# Patient Record
Sex: FEMALE | Race: WHITE | NOT HISPANIC OR LATINO | Employment: OTHER | URBAN - METROPOLITAN AREA
[De-identification: names, ages, dates, MRNs, and addresses within clinical notes are randomized per-mention and may not be internally consistent; named-entity substitution may affect disease eponyms.]

---

## 2022-09-20 ENCOUNTER — OFFICE VISIT (OUTPATIENT)
Dept: PODIATRY | Facility: CLINIC | Age: 57
End: 2022-09-20

## 2022-09-20 VITALS — BODY MASS INDEX: 32.65 KG/M2 | RESPIRATION RATE: 20 BRPM | HEIGHT: 65 IN | WEIGHT: 196 LBS

## 2022-09-20 DIAGNOSIS — M79.675 TOE PAIN, LEFT: ICD-10-CM

## 2022-09-20 DIAGNOSIS — L84 CALLUS OF FOOT: ICD-10-CM

## 2022-09-20 DIAGNOSIS — M79.672 LEFT FOOT PAIN: Primary | ICD-10-CM

## 2022-09-20 DIAGNOSIS — I73.9 PAD (PERIPHERAL ARTERY DISEASE): ICD-10-CM

## 2022-09-20 PROCEDURE — 11055 PARING/CUTG B9 HYPRKER LES 1: CPT

## 2022-09-20 PROCEDURE — 99203 OFFICE O/P NEW LOW 30 MIN: CPT

## 2022-09-20 RX ORDER — CLOTRIMAZOLE AND BETAMETHASONE DIPROPIONATE 10; .64 MG/G; MG/G
CREAM TOPICAL
COMMUNITY

## 2022-09-20 RX ORDER — UREA 40 %
1 CREAM (GRAM) TOPICAL 2 TIMES DAILY
Qty: 60 EACH | Refills: 2
Start: 2022-09-20

## 2022-09-20 RX ORDER — FUROSEMIDE 20 MG/1
TABLET ORAL
COMMUNITY

## 2022-09-20 RX ORDER — HYDROCODONE BITARTRATE AND ACETAMINOPHEN 10; 325 MG/1; MG/1
TABLET ORAL
COMMUNITY

## 2022-09-20 RX ORDER — GABAPENTIN 600 MG/1
TABLET ORAL
COMMUNITY

## 2022-09-20 RX ORDER — TOPIRAMATE 100 MG/1
TABLET, FILM COATED ORAL
COMMUNITY

## 2022-09-20 RX ORDER — ERGOCALCIFEROL 1.25 MG/1
CAPSULE ORAL
COMMUNITY

## 2022-09-20 RX ORDER — ESTRADIOL 10 UG/1
INSERT VAGINAL
COMMUNITY

## 2022-09-20 RX ORDER — ATORVASTATIN CALCIUM 40 MG/1
TABLET, FILM COATED ORAL
COMMUNITY

## 2022-09-20 RX ORDER — CARVEDILOL 25 MG/1
TABLET ORAL
COMMUNITY

## 2022-09-20 RX ORDER — COLLAGENASE SANTYL 250 [ARB'U]/G
OINTMENT TOPICAL
COMMUNITY

## 2022-09-20 RX ORDER — ISOSORBIDE MONONITRATE 30 MG/1
TABLET, EXTENDED RELEASE ORAL
COMMUNITY

## 2022-09-20 RX ORDER — MONTELUKAST SODIUM 10 MG/1
TABLET ORAL
COMMUNITY

## 2022-09-20 RX ORDER — NAPROXEN 500 MG/1
TABLET ORAL
COMMUNITY

## 2022-09-20 RX ORDER — BUSPIRONE HYDROCHLORIDE 30 MG/1
TABLET ORAL
COMMUNITY

## 2022-09-20 RX ORDER — NITROGLYCERIN 0.4 MG/1
TABLET SUBLINGUAL
COMMUNITY

## 2022-09-20 RX ORDER — CITALOPRAM 20 MG/1
TABLET ORAL
COMMUNITY

## 2022-09-20 RX ORDER — METHYLPREDNISOLONE 4 MG/1
TABLET ORAL
COMMUNITY
Start: 2022-09-15

## 2022-09-20 RX ORDER — AMMONIUM LACTATE 12 G/100G
CREAM TOPICAL
COMMUNITY

## 2022-09-20 RX ORDER — DIAZEPAM 5 MG/1
TABLET ORAL
COMMUNITY

## 2022-09-20 RX ORDER — HYDROXYZINE HYDROCHLORIDE 25 MG/1
TABLET, FILM COATED ORAL
COMMUNITY

## 2022-09-20 RX ORDER — KETOCONAZOLE 20 MG/G
CREAM TOPICAL
COMMUNITY

## 2022-09-20 RX ORDER — MELOXICAM 15 MG/1
TABLET ORAL
COMMUNITY

## 2022-09-20 RX ORDER — RISPERIDONE 0.25 MG/1
TABLET ORAL EVERY 8 HOURS SCHEDULED
COMMUNITY

## 2022-09-20 RX ORDER — PRIMIDONE 50 MG/1
TABLET ORAL
COMMUNITY

## 2022-09-20 RX ORDER — CLOPIDOGREL BISULFATE 75 MG/1
TABLET ORAL
COMMUNITY

## 2022-09-20 RX ORDER — CYCLOBENZAPRINE HCL 10 MG
TABLET ORAL
COMMUNITY

## 2022-09-20 RX ORDER — POTASSIUM CHLORIDE 750 MG/1
CAPSULE, EXTENDED RELEASE ORAL
COMMUNITY

## 2022-09-20 RX ORDER — ALBUTEROL SULFATE 90 UG/1
AEROSOL, METERED RESPIRATORY (INHALATION)
COMMUNITY

## 2022-09-20 RX ORDER — CALCIUM CARBONATE 200(500)MG
TABLET,CHEWABLE ORAL
COMMUNITY

## 2022-09-20 NOTE — PROGRESS NOTES
"09/20/2022  Foot and Ankle Surgery - New Patient   Provider: ANIL Veliz   Location: [facility]    Subjective:  Lidia Gaming is a 57 y.o. female.     Chief Complaint   Patient presents with   • Right Foot - Callouses   • Left Foot - Callouses   • Initial Evaluation     CYNTHIA Stahl md  9/7/2022       HPI: Patient presents to office today, new to service, with complaints of pain and callus to left lower extremity.  Patient reports that she has seen podiatry service in the past for this issue.  Patient reports history of \"breaking her ankle\" years ago.  Patient reports a history of falling a couple times this year and since then having pain to her left second and third toes.  Patient does have significant history of peripheral artery disease and is being followed by vascular surgery at [facility].  Chart review reveals patient had abnormal TEVIN to left lower extremity most recently and right TEVIN was within normal limits per primary care provider note a few weeks ago.    Allergies   Allergen Reactions   • Aloe Vera Unknown - Low Severity   • Petrolatum Unknown - Low Severity       Past Medical History:   Diagnosis Date   • Anxiety    • COPD (chronic obstructive pulmonary disease) (HCC)    • Heart disease    • Hypercholesterolemia    • Hypertension    • Pulmonary embolism (HCC)    • Status post arterial stent    • Stenosis of coronary artery stent        History reviewed. No pertinent surgical history.    Family History   Problem Relation Age of Onset   • Diabetes Mother    • Diabetes Sister    • Cancer Sister        Social History     Socioeconomic History   • Marital status: Single   Tobacco Use   • Smoking status: Heavy Tobacco Smoker   • Smokeless tobacco: Never Used   Vaping Use   • Vaping Use: Never used   Substance and Sexual Activity   • Alcohol use: Not Currently   • Drug use: Defer   • Sexual activity: Defer        Current Outpatient Medications on File Prior to Visit "   Medication Sig Dispense Refill   • albuterol sulfate  (90 Base) MCG/ACT inhaler albuterol sulfate HFA 90 mcg/actuation aerosol inhaler   INHALE 2 PUFFS EVERY 4 HOURS BY INHALATION ROUTE.     • ammonium lactate (AMLACTIN) 12 % cream ammonium lactate 12 % topical cream     • apixaban (ELIQUIS) 5 MG tablet tablet Eliquis 5 mg tablet     • atorvastatin (LIPITOR) 40 MG tablet atorvastatin 40 mg tablet   TAKE 1 TABLET BY MOUTH EVERY DAY     • busPIRone (BUSPAR) 30 MG tablet buspirone 30 mg tablet     • calcium carbonate (TUMS) 500 MG chewable tablet calcium carbonate antacid regu     • Cariprazine HCl (VRAYLAR) 3 MG capsule capsule Vraylar 3 mg capsule   Take 1 capsule every day by oral route.     • carvedilol (COREG) 25 MG tablet carvedilol 25 mg tablet     • citalopram (CeleXA) 20 MG tablet citalopram 20 mg tablet     • clopidogrel (PLAVIX) 75 MG tablet clopidogrel 75 mg tablet   TAKE 1 TABLET BY MOUTH EVERY DAY     • clotrimazole-betamethasone (LOTRISONE) 1-0.05 % cream clotrimazole-betamethasone 1 %-0.05 % topical cream     • collagenase (Santyl) 250 UNIT/GM ointment Santyl 250 unit/gram topical ointment     • cyclobenzaprine (FLEXERIL) 10 MG tablet cyclobenzaprine 10 mg tablet     • diazePAM (VALIUM) 5 MG tablet diazepam 5 mg tablet     • ergocalciferol (ERGOCALCIFEROL) 1.25 MG (24524 UT) capsule Vitamin D2 1,250 mcg (50,000 unit) capsule   Take 1 capsule every week by oral route.     • estradiol (VAGIFEM) 10 MCG tablet vaginal tablet estradiol 10 mcg vaginal tablet     • furosemide (LASIX) 20 MG tablet furosemide 20 mg tablet     • gabapentin (NEURONTIN) 600 MG tablet gabapentin 600 mg tablet     • HYDROcodone-acetaminophen (NORCO)  MG per tablet hydrocodone 10 mg-acetaminophen 325 mg tablet   TAKE 1 TABLET BY MOUTH THREE TIMES DAILY     • hydrOXYzine (ATARAX) 25 MG tablet hydroxyzine HCl 25 mg tablet     • isosorbide mononitrate (IMDUR) 30 MG 24 hr tablet isosorbide mononitrate ER 30 mg  "tablet,extended release 24 hr     • ketoconazole (NIZORAL) 2 % cream ketoconazole 2 % topical cream     • meloxicam (MOBIC) 15 MG tablet meloxicam 15 mg tablet     • methylPREDNISolone (MEDROL) 4 MG dose pack      • montelukast (SINGULAIR) 10 MG tablet montelukast 10 mg tablet     • naproxen (NAPROSYN) 500 MG tablet naproxen 500 mg tablet     • nitroglycerin (NITROSTAT) 0.4 MG SL tablet nitroglycerin 0.4 mg sublingual tablet     • potassium chloride (MICRO-K) 10 MEQ CR capsule potassium chloride ER 10 mEq capsule,extended release     • primidone (MYSOLINE) 50 MG tablet primidone 50 mg tablet     • risperiDONE (risperDAL) 0.25 MG tablet Every 8 (Eight) Hours.     • sodium chloride 0.9 % nebulizer solution 0.88 mL with albuterol (5 MG/ML) 0.5% nebulizer solution 0.6 mg albuterol sulfate   2 puffs Q 6 hours PRN     • topiramate (TOPAMAX) 100 MG tablet topiramate 100 mg tablet       No current facility-administered medications on file prior to visit.       Review of Systems:  General: Denies fever, chills, fatigue, and weakness.  Eyes: Denies vision loss, blurry vision, and excessive redness.  ENT: Denies hearing issues and difficulty swallowing.  Cardiovascular: Denies palpitations, chest pain, or syncopal episodes.  Respiratory: Denies shortness of breath, wheezing, and coughing.  GI: Denies abdominal pain, nausea, and vomiting.   : Denies frequency, hematuria, and urgency.  Musculoskeletal: Denies muscle cramps, joint pains, and stiffness.  Derm: Denies rash, open wounds, or suspicious lesions.  Neuro: Denies headaches, numbness, loss of coordination, and tremors.  Psych: Denies anxiety and depression.  Endocrine: Denies temperature intolerance and changes in appetite.  Heme: Denies bleeding disorders or abnormal bruising.     Objective   Resp 20   Ht 165.1 cm (65\")   Wt 88.9 kg (196 lb)   BMI 32.62 kg/m²     Foot/Ankle Exam:       General:   Appearance: appears stated age and healthy    Orientation: AAOx3  "   Affect: appropriate    Gait: unimpaired    Assistance: independent    Shoe Gear:  Sandals    VASCULAR      Right Foot Vascularity   Right DP grade: Unable to palpate.  Posterior tibial:  1+  Skin Temperature: warm    Edema Grading:  None  CFT:  < 3 seconds  Pedal Hair Growth:  Present  Varicosities: none       Left Foot Vascularity   Left DP grade: Not able to palpate.  Posterior tibial:  1+  Skin Temperature: warm    Edema Grading:  None  CFT:  < 3 seconds  Pedal Hair Growth:  Absent  Varicosities: none        NEUROLOGIC     Right Foot Neurologic   Light touch sensation:  Normal  Protective Sensation using Wendel-Shiv Monofilament:  10     Left Foot Neurologic   Light touch sensation:  Normal  Protective Sensation using Wendel-Shiv Monofilament:  10     MUSCULOSKELETAL      Right Foot Musculoskeletal   Ecchymosis:  None  Tenderness: none       Left Foot Musculoskeletal   Ecchymosis:  None  Tenderness: left foot callus    Hammertoe:  Second toe and third toe     DERMATOLOGIC     Right Foot Dermatologic   Skin: dry skin and skin changes    Nails: onychomycosis and abnormally thick       Left Foot Dermatologic   Skin: dry skin and skin changes    Nails: onychomycosis and abnormally thick        Assessment & Plan   Diagnoses and all orders for this visit:    1. Left foot pain (Primary)  -     XR Foot 3+ View Left    2. Callus of foot    3. Toe pain, left    4. PAD (peripheral artery disease) ([facility])    Other orders  -     urea (CARMOL) 40 % cream; Apply 1 application topically to the appropriate area as directed 2 (Two) Times a Day. Apply twice daily to calluses.  Dispense: 60 each; Refill: 2      On exam bilateral feet appear stable with no open wounds or signs of active acute infection.  Patient does have moderate callusing to left plantar forefoot.  Pathophysiology of callus was discussed with patient today.  We did review gentle exfoliation with pumice stone and urea-based moisturizer.  Discussed with  "patient that she is at risk for pedal complications related to peripheral artery disease and that it is important for her to continue to monitor her feet closely and to avoid going barefoot.  Patient has mild hammertoe deformity to left second and third toes pathophysiology was discussed with patient today and discussed importance of getting shoes that fit appropriately with toebox to accommodate slight hammertoe deformity.  Will obtain x-ray today to understand pathology and rule out other causes of toe pain.  Recommend patient obtain over-the-counter arch supports with metatarsal pad to offload and redistribute forefoot pressure to prevent callus formation from reoccurring.  Patient does not want to purchase today due to cost.  Patient was also given calf stretching exercises to help with limbering the calf muscle and decreasing forefoot pressure however she reports that she cannot do stretching due to her right knee \"being bone-on-bone\"..      Callus to left plantar forefoot was carefully pared down today with #15 scalpel blade.  No complication and patient tolerated well.    Would like for patient to follow-up in 6 weeks for reevaluation and to review x-ray results.  Patient verbalized understanding and agreeable to plan at this time.    Orders Placed This Encounter   Procedures   • XR Foot 3+ View Left     Order Specific Question:   Reason for Exam:     Answer:   LEFT FOOT PAIN NEAR TOES  ROOM 13  WB   MAY LEAVE AFTER XRAY     Order Specific Question:   Does this patient have a diabetic monitoring/medication delivering device on?     Answer:   No     Order Specific Question:   Release to patient     Answer:   Routine Release        Note is dictated utilizing voice recognition software. Unfortunately this leads to occasional typographical errors. I apologize in advance if the situation occurs. If questions occur please do not hesitate to call our office.    "

## 2023-03-30 ENCOUNTER — OFFICE VISIT (OUTPATIENT)
Dept: ORTHOPEDIC SURGERY | Facility: CLINIC | Age: 58
End: 2023-03-30
Payer: MEDICARE

## 2023-03-30 VITALS — BODY MASS INDEX: 33.12 KG/M2 | WEIGHT: 199 LBS | OXYGEN SATURATION: 96 % | HEART RATE: 66 BPM

## 2023-03-30 DIAGNOSIS — M17.11 PRIMARY OSTEOARTHRITIS OF RIGHT KNEE: ICD-10-CM

## 2023-03-30 DIAGNOSIS — G89.29 CHRONIC PAIN OF RIGHT KNEE: Primary | ICD-10-CM

## 2023-03-30 DIAGNOSIS — Z72.0 TOBACCO ABUSE DISORDER: ICD-10-CM

## 2023-03-30 DIAGNOSIS — M25.561 CHRONIC PAIN OF RIGHT KNEE: Primary | ICD-10-CM

## 2023-03-30 RX ORDER — TOPIRAMATE 50 MG/1
50 TABLET, FILM COATED ORAL 2 TIMES DAILY
COMMUNITY

## 2023-03-30 RX ORDER — CHOLECALCIFEROL (VITAMIN D3) 125 MCG
500 CAPSULE ORAL DAILY
COMMUNITY

## 2023-03-30 RX ORDER — VITAMIN E 268 MG
CAPSULE ORAL
COMMUNITY

## 2023-03-30 RX ORDER — FLUTICASONE FUROATE, UMECLIDINIUM BROMIDE AND VILANTEROL TRIFENATATE 200; 62.5; 25 UG/1; UG/1; UG/1
POWDER RESPIRATORY (INHALATION)
COMMUNITY

## 2023-03-30 NOTE — PROGRESS NOTES
NEW VISIT    Patient: Lidia Gaming  ?  YOB: 1965    MRN: [id number]  ?  Chief Complaint   Patient presents with   • Right Knee - Initial Evaluation          HPI: Patient 57-year-old female who presents today for chronic right knee pain.  Denies any acute injury.  She was recently seen by her PCP, and has also been seen by 2 prior orthopedists Dr. Arriaza and Dr. Ogden locally.  She has tried conservative measures for her knee pain, and at this time wants to discuss potential surgical options.  She had discussed a partial knee replacement with Dr. Arriaza, but was seeking a second opinion as to a partial versus a full knee replacement.  She was also seen by Dr. Ogden, but he wanted to proceed with conservative management until the patient stopped smoking due to medical history and risk of surgery.  Is primarily over the medial and anterior knee.  Associated painful popping.  Denies locking or catching.  Occasional swelling.  Denies any numbness or tingling.  She has previously tried corticosteroid and viscosupplementation injections which gave her no relief. She also takes Norco 10 through her PCP for chronic pain.  Unable to take NSAIDs due to heart history and on blood thinners.  She has failed conservative options including activity modification, oral medications, injection therapy including corticosteroid and viscosupplementation, and bracing.     Allergies:   Allergies   Allergen Reactions   • Aloe Vera Unknown - Low Severity   • Petrolatum Unknown - Low Severity       Past Medical History:   Diagnosis Date   • Anxiety    • COPD (chronic obstructive pulmonary disease) ([facility])    • Heart disease    • Hypercholesterolemia    • Hypertension    • Pulmonary embolism (HCC)    • Status post arterial stent    • Stenosis of coronary artery stent      Past Surgical History:   Procedure Laterality Date   • CORONARY ANGIOPLASTY WITH STENT PLACEMENT      x2 2004, 2012, 2020,   • VASCULAR SURGERY      angio seal 2020      Social History     Occupational History   • Not on file   Tobacco Use   • Smoking status: Heavy Smoker   • Smokeless tobacco: Never   Vaping Use   • Vaping Use: Never used   Substance and Sexual Activity   • Alcohol use: Not Currently   • Drug use: Defer   • Sexual activity: Defer      Social History     Social History Narrative   • Not on file     Family History   Problem Relation Age of Onset   • Diabetes Mother    • Diabetes Sister    • Cancer Sister        Review of Systems  Constitutional: Negative.  Negative for fever.   Musculoskeletal: Positive for joint pain  Skin: Negative.  Negative for rash and wound.    Neurological: Negative for numbness.       Body mass index is 33.12 kg/m².  Vitals:    03/30/23 1451   Pulse: 66   SpO2: 96%   Weight: 90.3 kg (199 lb)        Physical Exam  Constitutional: Patient is oriented to person, place, and time. Appears well-developed and well-nourished.   Head: Normocephalic and atraumatic.   Pulmonary/Chest: Effort normal.   Musculoskeletal:   See detailed exam below   Neurological: Alert and oriented to person, place, and time. No sensory deficit. Coordination normal.   Skin: Skin is warm and dry. Capillary refill takes less than 2 seconds. No rash noted. No erythema.     The right knee is without obvious signs of acute bony deformity, quadriceps atrophy, erythema, ecchymosis.  Trace joint effusion.  The patella is with tenderness. Apprehension is negative with medial and lateral glide. Patella crepitus is positive. Patella grind is positive.  There is tenderness at the medial joint line.  There is mild tenderness of the lateral joint line.  Soft tissue including the distal hamstring tendons, pes anserine, quad tendon, patellar tendon, proximal gastroc tendon, distal IT band, and Gerdy's tubercle are nontender. Flexion & extension are limited and painful at end range flexion and extension.  Knee strength is 4/5 and symmetrical. Varus & valgus stress, Lachman's, anterior  drawer, Maryan's, and posterior drawer are all negative. The opposite knee is nontender and stable. Gait is painful and antalgic.    Diagnostics:  xrays obtained today    Right Knee X-Ray  Indication: Pain    Views: AP, Lateral, and [place]    Findings:  No fracture  No bony lesion  There is subchondral sclerosis and joint space narrowing most pronounced within the patellofemoral and medial compartment consistent with moderate to severe osteoarthritic change.  Prior arterial stent in atherosclerotic calcifications are noted.      Repeat x-rays today moderate arthritic change noted primarily in the medial and patellofemoral compartment.  Prior vessel stent and arthrosclerotic calcifications noted.    Reviewed radiology report of xrays of Three-view right knee, from [facility] on 6/9/2022, summary of impression below:   No acute bony abnormalities.  Joint space narrowing with subchondral sclerosis of the patellofemoral joint consistent with moderate patellofemoral arthritis.  Noted atherosclerotic calcifications.       Assessment:  Diagnoses and all orders for this visit:    1. Chronic pain of right knee (Primary)  -     XR Knee 3 View Right    2. Primary osteoarthritis of knees, bilateral    3. Tobacco abuse disorder        Plan    · Intra-articular corticosteroid and viscosupplementation injection discussed   · Physical Therapy discussed   · Activity modifications discussed   · Discussed knee Redi brace or compression knee sleeve  · Rest, ice, compression, and elevation (RICE) therapy  · Pain medication through PCP.  Unable to take anti-inflammatories due to medical comorbidities.    · Follow up with Dr. Sims as discussed     Above diagnosis and plan discussed with the patient in office today.  She is here seeking a third opinion for her right knee osteoarthritis.  She has previously seen Dr. Arriaza and Dr. Ogden regarding potential surgical opinions for her right knee OA.  She states Dr. Arriaza had discussed  a partial knee replacement, but she wanted a second opinion to discuss a potential full knee replacement.  Subsequently seen by Dr. Ogden, who through patient history states wanted to continue with conservative management including injection therapy given her medical comorbidities and smoking history.  Presented to my office today for third opinion.  She has previously failed conservative management including both viscosupplementation and corticosteroid injections, bracing, home exercises, and activity modifications.  She was wanting to discuss surgical options.  I discussed with her that I am a nonoperative sports medicine physician and I do not do surgery, and as such if she wants to discuss surgical options I could refer her refer her to my surgical partner Dr. Sims.  I also discussed the option for her to return to Dr. Arriaza or Dr. Ogden for continued management and to continue to discuss surgical options.  I did encourage her to stop smoking as this increases her risk of any surgical procedure for complication, risk of infection and risk of anesthesia.  Also given her chronic medical history including cardiovascular and peripheral vascular disease I discussed that she is a higher risk as a surgical candidate and this would have to be factored with any surgical discussion.  After long discussion she elected to proceed with referral to Dr. Sims to discuss potential surgical options.  Referral was placed today    Total time: 50 minutes. This includes time spent with the patient, counseling patient about their above diagnosis, but also time spent before the visit reviewing the chart and time after the visit documenting the visit, reviewing imaging studies, discussing recommendations. etc.      Date of encounter: 03/30/2023   Boris Nuñez DO    Electronically signed by Boris Nuñez DO, 03/30/23, 1:00 PM EDT.    Disclaimer: Please note that areas of this note were completed with computer voice recognition software.   Quite often unanticipated grammatical, syntax, homophones, and other interpretive errors are inadvertently transcribed by the computer software. Please excuse any errors that have escaped final proofreading.

## 2023-05-31 ENCOUNTER — OFFICE VISIT (OUTPATIENT)
Dept: ORTHOPEDIC SURGERY | Facility: CLINIC | Age: 58
End: 2023-05-31
Payer: MEDICARE

## 2023-05-31 VITALS — WEIGHT: 202 LBS | HEIGHT: 65 IN | BODY MASS INDEX: 33.66 KG/M2

## 2023-05-31 DIAGNOSIS — M25.562 LEFT KNEE PAIN, UNSPECIFIED CHRONICITY: Primary | ICD-10-CM

## 2023-05-31 NOTE — PROGRESS NOTES
"Chief Complaint  Establish Care of the Left Knee and Establish Care of the Right Knee    Subjective    History of Present Illness      Lidia Gaming is a 58 y.o. female who presents to [facility] for bilateral knee pain and discomfort.  History of Present Illness the patient is here for a fourth opinion on her knee.  She continues to have pain and discomfort after a fall about 4 months ago.  She fell directly on the anterior aspect of the knee.  X-ray was obtained but no MRI.  She has continued to have pain and limps just about all the time.  She has been seen by doctors at [facility], Eliza Ogden and Boris valderrama.  She has received a gel injection for viscosupplementation but that has really not helped her symptoms.  She does have fairly advanced peripheral vascular disease.  Patient also tells me that most of her symptoms appear to be related to the patellofemoral joint.  She has difficulty with squatting on the ground and difficulty going up and down the steps.  Occasionally, the knee will buckle and give out from a liquor.  Pain Location:  BILATERAL knee  Radiation: none  Quality: dull, aching  Intensity/Severity: varies between mild and severe  Duration: Several months  Progression of symptoms: yes, progressive worsening  Onset quality: gradual   Timing: constant  Aggravating Factors: rising after sitting, squatting  Alleviating Factors: NSAIDs, Visco supplementation injection, rest, brace  Previous Episodes: yes  Associated Symptoms: pain, swelling, decreased ROM, decreased strength  ADLs Affected: grooming/hygiene/toileting/personal care, dressing, ambulating, work related activities, recreational activities/sports  Previous Treatment: NSAIDs, brace and intra-articular injection       Objective   Vital Signs:   Ht 165.1 cm (65\")   Wt 91.6 kg (202 lb)   BMI 33.61 kg/m²     Physical Exam  Physical Exam  Vitals signs and nursing note reviewed.   Constitutional:       Appearance: Normal " appearance.   Pulmonary:      Effort: Pulmonary effort is normal.   Skin:     General: Skin is warm and dry.      Capillary Refill: Capillary refill takes less than 2 seconds.   Neurological:      General: No focal deficit present.      Mental Status: He is alert and oriented to person, place, and time. Mental status is at baseline.   Psychiatric:         Mood and Affect: Mood normal.         Behavior: Behavior normal.         Thought Content: Thought content normal.         Judgment: Judgment normal.     Ortho Exam   Right knee (meniscus). The knee joint shows effusion with some thickening of the synovial membrane. There is tenderness over the meniscus. Apley's grinding test is positive over the joint line. Maryan's sign is positive with increased pain on torsional testing. There is a distinct click in mid flexion. Range of motion is from 0-120 degrees of flexion. No instability on medial or lateral testing. Anterior and posterior drawer testing is negative. Lachman test is negative. Joint line tenderness is present to direct palpation. There is some tenderness over the medial face of the tibia just distal to the joint line. The dorsalis pedis and posterior tibial artery pulses are palpable. Common peroneal nerve function is well preserved. Gait is cautious and somewhat antalgic. Full extension causes the patient to have quite a bit of pain and discomfort.               Result Review :   The following data was reviewed by: Francis Sims MD on 05/31/2023:  Radiologic studies - see below for interpretation  xrays obtained today    bilateral Knee X-Ray  Indication: Evaluation of pain and discomfort in the medial aspect of both knees.  AP, Lateral views  Findings: Slight narrowing of the patellofemoral joint space is noted on the left knee.  Otherwise the tibiotalar articulation is fairly well-preserved.  no bony lesion  Soft tissues within normal limits  decreased joint spaces  Hardware appropriately positioned  yes      no prior studies available for comparison.    This patient's x-ray report was graded according to the Kellgren and Shaun classification.  This took into account the joint space narrowing, osteophyte formation, sclerosis of the distal femur/proximal tibia along with deformity of those bones.  The findings were indicative of K L grade 2.    X-RAY was ordered and reviewed by Francis Sims MD        Procedures           Assessment   Assessment and Plan    Diagnoses and all orders for this visit:    1. Left knee pain, unspecified chronicity (Primary)  -     XR Knee 3 View Left  -     MRI Knee Left Without Contrast; Future          Follow Up   · Compression/brace to prevent the knee from buckling and giving out.  · Calcium and vitamin D for bone health.  · Intra-articular steroid injection and viscosupplementation injections discussed with the patient at length.  · She does have peripheral vascular disease which possibly is causing some amounts of neurogenic claudication.  · Schedule an MRI of the knee for evaluation of intra-articular pathology such as a meniscus tear versus medial collateral ligament injury.  · Rest, ice, compression, and elevation (RICE) therapy  · Stretching and strengthening exercises of the quads and the hamstrings.  · OTC Alternate Ibuprofen and Tylenol as needed  · Follow up in 4-6 week(s)  • Patient was given instructions and counseling regarding her condition or for health maintenance advice. Please see specific information pulled into the AVS if appropriate.     Francis Sims MD   Date of Encounter: 5/31/2023   Electronically signed by Francis Sims MD, 05/31/23, 3:18 PM EDT.     EMR Dragon/Transcription disclaimer:  Much of this encounter note is an electronic transcription/translation of spoken language to printed text. The electronic translation of spoken language may permit erroneous, or at times, nonsensical words or phrases to be inadvertently transcribed; Although I have  reviewed the note for such errors, some may still exist.

## 2023-06-15 PROBLEM — M25.562 LEFT KNEE PAIN: Status: ACTIVE | Noted: 2023-06-15
